# Patient Record
Sex: MALE | Race: BLACK OR AFRICAN AMERICAN | Employment: UNEMPLOYED | URBAN - METROPOLITAN AREA
[De-identification: names, ages, dates, MRNs, and addresses within clinical notes are randomized per-mention and may not be internally consistent; named-entity substitution may affect disease eponyms.]

---

## 2020-01-08 ENCOUNTER — HOSPITAL ENCOUNTER (EMERGENCY)
Age: 25
Discharge: HOME | End: 2020-01-08
Attending: EMERGENCY MEDICINE
Payer: SELF-PAY

## 2020-01-08 ENCOUNTER — APPOINTMENT (OUTPATIENT)
Dept: GENERAL RADIOLOGY | Age: 25
End: 2020-01-08
Attending: EMERGENCY MEDICINE
Payer: SELF-PAY

## 2020-01-08 VITALS
RESPIRATION RATE: 12 BRPM | WEIGHT: 190 LBS | HEIGHT: 71 IN | TEMPERATURE: 98.2 F | OXYGEN SATURATION: 100 % | HEART RATE: 74 BPM | BODY MASS INDEX: 26.6 KG/M2

## 2020-01-08 DIAGNOSIS — S93.402A SPRAIN OF LEFT ANKLE, UNSPECIFIED LIGAMENT, INITIAL ENCOUNTER: Primary | ICD-10-CM

## 2020-01-08 PROCEDURE — 73610 X-RAY EXAM OF ANKLE: CPT

## 2020-01-08 PROCEDURE — 99283 EMERGENCY DEPT VISIT LOW MDM: CPT

## 2020-01-08 PROCEDURE — L1930 AFO PLASTIC: HCPCS

## 2020-01-08 RX ORDER — IBUPROFEN 600 MG/1
600 TABLET ORAL
Qty: 20 TAB | Refills: 0 | Status: SHIPPED | OUTPATIENT
Start: 2020-01-08 | End: 2021-05-12

## 2020-01-08 RX ORDER — TRAMADOL HYDROCHLORIDE 50 MG/1
50 TABLET ORAL
Qty: 18 TAB | Refills: 0 | Status: SHIPPED | OUTPATIENT
Start: 2020-01-08 | End: 2020-01-11

## 2020-01-08 NOTE — ED PROVIDER NOTES
EMERGENCY DEPARTMENT HISTORY AND PHYSICAL EXAM    Date: 1/8/2020  Patient Name: Mio Goldman    History of Presenting Illness     Chief Complaint   Patient presents with    Ankle Pain         History Provided By: Patient    Chief Complaint: ankle pain    HPI(Context):   3:02 PM  Mio Goldman is a 22 y.o. male with PMHX of asthma and ADHD who presents to the emergency department C/O left ankle injury. Associated sxs include left ankle swelling. Pain worse with movement. Better with rest. Pt was playing basketball yesterday when he turned left ankle. Pt elevated and rested with no improvement. Pt denies numbness, weakness, color change and any other sxs or complaints. PCP: None    Current Outpatient Medications   Medication Sig Dispense Refill    ibuprofen (MOTRIN) 600 mg tablet Take 1 Tab by mouth every six (6) hours as needed for Pain. Take with food. 20 Tab 0    traMADol (ULTRAM) 50 mg tablet Take 1 Tab by mouth every four (4) hours as needed for Pain for up to 3 days. Max Daily Amount: 300 mg. 18 Tab 0    albuterol (PROVENTIL VENTOLIN) 2.5 mg /3 mL (0.083 %) nebulizer solution 3 mL by Nebulization route every four (4) hours as needed for Wheezing. 1 Package 0    fluticasone-salmeterol (ADVAIR DISKUS) 100-50 mcg/dose diskus inhaler Take 1 puff by inhalation every twelve (12) hours. 1 Inhaler 0    fluticasone-salmeterol (ADVAIR DISKUS) 100-50 mcg/dose diskus inhaler Take 1 Puff by inhalation every twelve (12) hours.  albuterol (VENTOLIN HFA) 90 mcg/actuation inhaler Take  by inhalation. Past History     Past Medical History:  Past Medical History:   Diagnosis Date    ADHD (attention deficit hyperactivity disorder)     Asthma        Past Surgical History:  Past Surgical History:   Procedure Laterality Date    HX HEENT      ear tubes       Family History:  History reviewed. No pertinent family history.     Social History:  Social History     Tobacco Use    Smoking status: Never Smoker  Smokeless tobacco: Never Used   Substance Use Topics    Alcohol use: No    Drug use: Never       Allergies:  No Known Allergies      Review of Systems   Review of Systems   Musculoskeletal: Positive for arthralgias and joint swelling. Skin: Negative for color change. Neurological: Negative for weakness and numbness. All other systems reviewed and are negative. Physical Exam     Vitals:    01/08/20 1501   Pulse: 74   Resp: 12   Temp: 98.2 °F (36.8 °C)   SpO2: 100%   Weight: 86.2 kg (190 lb)   Height: 5' 11\" (1.803 m)     Physical Exam  Vitals signs and nursing note reviewed. Constitutional:       General: He is not in acute distress. Appearance: Normal appearance. Comments: AA male in NAD. Alert. Appears comfortable. HENT:      Head: Normocephalic and atraumatic. Right Ear: External ear normal.      Left Ear: External ear normal.      Nose: Nose normal.   Neck:      Musculoskeletal: Normal range of motion. Cardiovascular:      Rate and Rhythm: Normal rate and regular rhythm. Pulses:           Dorsalis pedis pulses are 2+ on the right side and 2+ on the left side. Posterior tibial pulses are 2+ on the right side and 2+ on the left side. Pulmonary:      Effort: Pulmonary effort is normal.      Breath sounds: Normal breath sounds. Musculoskeletal:      Left knee: He exhibits normal range of motion, no swelling and no effusion. No tenderness found. Left ankle: He exhibits swelling. He exhibits normal range of motion, no ecchymosis and no deformity. Tenderness. Lateral malleolus and medial malleolus tenderness found. Right lower leg: He exhibits no tenderness, no bony tenderness and no swelling. No edema. Left lower leg: He exhibits no tenderness, no bony tenderness and no swelling. No edema. Left foot: Normal range of motion. No tenderness or bony tenderness. Skin:     General: Skin is warm.    Neurological:      Mental Status: He is oriented to person, place, and time. Psychiatric:         Mood and Affect: Mood normal.             Diagnostic Study Results     Labs -   No results found for this or any previous visit (from the past 12 hour(s)). XR ANKLE LT MIN 3 V   Final Result   IMPRESSION:      No acute osseous abnormality identified. CT Results  (Last 48 hours)    None        CXR Results  (Last 48 hours)    None          Medications given in the ED-  Medications - No data to display      Medical Decision Making   I am the first provider for this patient. I reviewed the vital signs, available nursing notes, past medical history, past surgical history, family history and social history. Vital Signs-Reviewed the patient's vital signs. Pulse Oximetry Analysis - 100% on RA    Records Reviewed: Nursing Notes and Old Medical Records    Provider Notes (Medical Decision Making): sprain, strain, fx, ligamentous    Procedures:  Procedures    ED Course:   3:02 PM Initial assessment performed. The patients presenting problems have been discussed, and they are in agreement with the care plan formulated and outlined with them. I have encouraged them to ask questions as they arise throughout their visit. Diagnosis and Disposition       Imaging unremarkable. NVI. Will tx with RICE and pain control. Ortho FU. Reasons to RTED discussed with pt. All questions answered. Pt feels comfortable going home at this time. Pt expressed understanding and she agrees with plan. 1. Sprain of left ankle, unspecified ligament, initial encounter        PLAN:  1. D/C Home  2. Discharge Medication List as of 1/8/2020  4:06 PM      START taking these medications    Details   ibuprofen (MOTRIN) 600 mg tablet Take 1 Tab by mouth every six (6) hours as needed for Pain. Take with food. , Print, Disp-20 Tab, R-0      traMADol (ULTRAM) 50 mg tablet Take 1 Tab by mouth every four (4) hours as needed for Pain for up to 3 days.  Max Daily Amount: 300 mg., Print, Disp-18 Tab, R-0         CONTINUE these medications which have NOT CHANGED    Details   albuterol (PROVENTIL VENTOLIN) 2.5 mg /3 mL (0.083 %) nebulizer solution 3 mL by Nebulization route every four (4) hours as needed for Wheezing., Print, Disp-1 Package, R-0      !! fluticasone-salmeterol (ADVAIR DISKUS) 100-50 mcg/dose diskus inhaler Take 1 puff by inhalation every twelve (12) hours. , Print, Disp-1 Inhaler, R-0      !! fluticasone-salmeterol (ADVAIR DISKUS) 100-50 mcg/dose diskus inhaler Not for PRN use. Not for use as a rescue inhaler. Take 1 Puff by inhalation every twelve (12) hours. Historical Med, 1 Puff      albuterol (VENTOLIN HFA) 90 mcg/actuation inhaler Take  by inhalation. Historical Med       !! - Potential duplicate medications found. Please discuss with provider. 3.   Follow-up Information     Follow up With Specialties Details Why Contact Info    Cameron Vinson MD Orthopedic Surgery   [address] [place] [address] [id number]      [facility] EMERGENCY DEPT Emergency Medicine  As needed, If symptoms worsen [address] [place] [id number] [phone]        _______________________________    Attestations: This note is prepared by Sonal Lion PA-C.  _______________________________        Please note that this dictation was completed with [employer], the 2U voice recognition software. Quite often unanticipated grammatical, syntax, homophones, and other interpretive errors are inadvertently transcribed by the computer software. Please disregard these errors. Please excuse any errors that have escaped final proofreading.

## 2021-02-23 ENCOUNTER — HOSPITAL ENCOUNTER (EMERGENCY)
Age: 26
Discharge: HOME | End: 2021-02-24
Attending: EMERGENCY MEDICINE

## 2021-02-23 ENCOUNTER — APPOINTMENT (OUTPATIENT)
Dept: GENERAL RADIOLOGY | Age: 26
End: 2021-02-23
Attending: EMERGENCY MEDICINE

## 2021-02-23 DIAGNOSIS — R07.89 ATYPICAL CHEST PAIN: Primary | ICD-10-CM

## 2021-02-23 PROCEDURE — 80053 COMPREHEN METABOLIC PANEL: CPT

## 2021-02-23 PROCEDURE — 71045 X-RAY EXAM CHEST 1 VIEW: CPT

## 2021-02-23 PROCEDURE — 93005 ELECTROCARDIOGRAM TRACING: CPT

## 2021-02-23 PROCEDURE — 83735 ASSAY OF MAGNESIUM: CPT

## 2021-02-23 PROCEDURE — 85025 COMPLETE CBC W/AUTO DIFF WBC: CPT

## 2021-02-23 PROCEDURE — 99285 EMERGENCY DEPT VISIT HI MDM: CPT

## 2021-02-23 PROCEDURE — 94762 N-INVAS EAR/PLS OXIMTRY CONT: CPT

## 2021-02-23 PROCEDURE — 82553 CREATINE MB FRACTION: CPT

## 2021-02-23 PROCEDURE — 85379 FIBRIN DEGRADATION QUANT: CPT

## 2021-02-24 ENCOUNTER — APPOINTMENT (OUTPATIENT)
Dept: CT IMAGING | Age: 26
End: 2021-02-24
Attending: EMERGENCY MEDICINE

## 2021-02-24 VITALS
HEIGHT: 71 IN | BODY MASS INDEX: 27.3 KG/M2 | WEIGHT: 195 LBS | SYSTOLIC BLOOD PRESSURE: 138 MMHG | DIASTOLIC BLOOD PRESSURE: 95 MMHG | OXYGEN SATURATION: 100 % | HEART RATE: 88 BPM | RESPIRATION RATE: 16 BRPM | TEMPERATURE: 97.7 F

## 2021-02-24 LAB
ALBUMIN SERPL-MCNC: 4.2 G/DL (ref 3.4–5)
ALBUMIN/GLOB SERPL: 1.2 {RATIO} (ref 0.8–1.7)
ALP SERPL-CCNC: 58 U/L (ref 45–117)
ALT SERPL-CCNC: 73 U/L (ref 16–61)
AMPHET UR QL SCN: NEGATIVE
ANION GAP SERPL CALC-SCNC: 6 MMOL/L (ref 3–18)
AST SERPL-CCNC: 30 U/L (ref 10–38)
ATRIAL RATE: 110 BPM
BARBITURATES UR QL SCN: NEGATIVE
BASOPHILS # BLD: 0 K/UL (ref 0–0.1)
BASOPHILS NFR BLD: 0 % (ref 0–2)
BENZODIAZ UR QL: NEGATIVE
BILIRUB SERPL-MCNC: 0.3 MG/DL (ref 0.2–1)
BUN SERPL-MCNC: 11 MG/DL (ref 7–18)
BUN/CREAT SERPL: 10 (ref 12–20)
CALCIUM SERPL-MCNC: 9.1 MG/DL (ref 8.5–10.1)
CALCULATED P AXIS, ECG09: 60 DEGREES
CALCULATED R AXIS, ECG10: 70 DEGREES
CALCULATED T AXIS, ECG11: -29 DEGREES
CANNABINOIDS UR QL SCN: NEGATIVE
CHLORIDE SERPL-SCNC: 105 MMOL/L (ref 100–111)
CK MB CFR SERPL CALC: 0.2 % (ref 0–4)
CK MB SERPL-MCNC: 2.2 NG/ML (ref 5–25)
CK SERPL-CCNC: 923 U/L (ref 39–308)
CO2 SERPL-SCNC: 28 MMOL/L (ref 21–32)
COCAINE UR QL SCN: NEGATIVE
CREAT SERPL-MCNC: 1.1 MG/DL (ref 0.6–1.3)
D DIMER PPP FEU-MCNC: 0.29 UG/ML(FEU)
DIAGNOSIS, 93000: NORMAL
DIFFERENTIAL METHOD BLD: ABNORMAL
EOSINOPHIL # BLD: 0.2 K/UL (ref 0–0.4)
EOSINOPHIL NFR BLD: 2 % (ref 0–5)
ERYTHROCYTE [DISTWIDTH] IN BLOOD BY AUTOMATED COUNT: 13.4 % (ref 11.6–14.5)
GLOBULIN SER CALC-MCNC: 3.5 G/DL (ref 2–4)
GLUCOSE SERPL-MCNC: 132 MG/DL (ref 74–99)
HCT VFR BLD AUTO: 46.8 % (ref 36–48)
HDSCOM,HDSCOM: NORMAL
HGB BLD-MCNC: 15.9 G/DL (ref 13–16)
LYMPHOCYTES # BLD: 2 K/UL (ref 0.9–3.6)
LYMPHOCYTES NFR BLD: 28 % (ref 21–52)
MAGNESIUM SERPL-MCNC: 2.2 MG/DL (ref 1.6–2.6)
MCH RBC QN AUTO: 27.4 PG (ref 24–34)
MCHC RBC AUTO-ENTMCNC: 34 G/DL (ref 31–37)
MCV RBC AUTO: 80.7 FL (ref 74–97)
METHADONE UR QL: NEGATIVE
MONOCYTES # BLD: 0.8 K/UL (ref 0.05–1.2)
MONOCYTES NFR BLD: 11 % (ref 3–10)
NEUTS SEG # BLD: 4.1 K/UL (ref 1.8–8)
NEUTS SEG NFR BLD: 59 % (ref 40–73)
OPIATES UR QL: NEGATIVE
P-R INTERVAL, ECG05: 150 MS
PCP UR QL: NEGATIVE
PLATELET # BLD AUTO: 324 K/UL (ref 135–420)
PMV BLD AUTO: 9.1 FL (ref 9.2–11.8)
POTASSIUM SERPL-SCNC: 3.6 MMOL/L (ref 3.5–5.5)
PROT SERPL-MCNC: 7.7 G/DL (ref 6.4–8.2)
Q-T INTERVAL, ECG07: 318 MS
QRS DURATION, ECG06: 84 MS
QTC CALCULATION (BEZET), ECG08: 430 MS
RBC # BLD AUTO: 5.8 M/UL (ref 4.7–5.5)
SODIUM SERPL-SCNC: 139 MMOL/L (ref 136–145)
TROPONIN I SERPL-MCNC: <0.02 NG/ML (ref 0–0.04)
VENTRICULAR RATE, ECG03: 110 BPM
WBC # BLD AUTO: 7 K/UL (ref 4.6–13.2)

## 2021-02-24 PROCEDURE — 71275 CT ANGIOGRAPHY CHEST: CPT

## 2021-02-24 PROCEDURE — 80307 DRUG TEST PRSMV CHEM ANLYZR: CPT

## 2021-02-24 PROCEDURE — 74011250636 HC RX REV CODE- 250/636: Performed by: EMERGENCY MEDICINE

## 2021-02-24 PROCEDURE — 74011000636 HC RX REV CODE- 636: Performed by: EMERGENCY MEDICINE

## 2021-02-24 RX ADMIN — IOPAMIDOL 80 ML: 755 INJECTION, SOLUTION INTRAVENOUS at 02:12

## 2021-02-24 RX ADMIN — SODIUM CHLORIDE 1000 ML: 900 INJECTION, SOLUTION INTRAVENOUS at 01:44

## 2021-02-24 RX ADMIN — SODIUM CHLORIDE 1000 ML: 900 INJECTION, SOLUTION INTRAVENOUS at 00:13

## 2021-02-24 NOTE — ED NOTES
Bedside and Verbal shift change report given to Celia Nickerson RN (oncoming nurse) by Arden Troncoso RN (offgoing nurse). Report included the following information SBAR, Kardex, ED Summary, [facility] and Recent Results.

## 2021-02-24 NOTE — ED PROVIDER NOTES
EMERGENCY DEPARTMENT HISTORY AND PHYSICAL EXAM    Date: 2/23/2021  Patient Name: Luanne Carranza    History of Presenting Illness     Chief Complaint   Patient presents with    Chest Pain         History Provided By: Patient    11:27 PM  Luanne Carranza is a 26 y.o. male with PMHX of asthma who presents to the emergency department C/O chest pain. Patient reports for the past week he has had chest pain over the left side of his chest.  He reports it is worse at night and often wakes him up from sleep. No clear relieving factors identified. He denies any associated shortness of breath, fever, cough, vomiting, lower extremity edema. He does not smoke or use any substances. Denies any recent travel or sick contacts. Denies family history of heart problems. PCP: None    Current Outpatient Medications   Medication Sig Dispense Refill    ibuprofen (MOTRIN) 600 mg tablet Take 1 Tab by mouth every six (6) hours as needed for Pain. Take with food. 20 Tab 0    albuterol (PROVENTIL VENTOLIN) 2.5 mg /3 mL (0.083 %) nebulizer solution 3 mL by Nebulization route every four (4) hours as needed for Wheezing. 1 Package 0    fluticasone-salmeterol (ADVAIR DISKUS) 100-50 mcg/dose diskus inhaler Take 1 puff by inhalation every twelve (12) hours. 1 Inhaler 0    fluticasone-salmeterol (ADVAIR DISKUS) 100-50 mcg/dose diskus inhaler Take 1 Puff by inhalation every twelve (12) hours.  albuterol (VENTOLIN HFA) 90 mcg/actuation inhaler Take  by inhalation. Past History     Past Medical History:  Past Medical History:   Diagnosis Date    ADHD (attention deficit hyperactivity disorder)     Asthma        Past Surgical History:  Past Surgical History:   Procedure Laterality Date    HX HEENT      ear tubes       Family History:  No family history on file. Social History:  Social History     Tobacco Use    Smoking status: Never Smoker    Smokeless tobacco: Never Used   Substance Use Topics    Alcohol use:  No  Drug use: Never       Allergies:  No Known Allergies      Review of Systems   Review of Systems   Constitutional: Negative for fever. Respiratory: Negative for shortness of breath. Cardiovascular: Positive for chest pain. Gastrointestinal: Negative for abdominal pain. All other systems reviewed and are negative.         Physical Exam     Vitals:    02/24/21 0030 02/24/21 0100 02/24/21 0130 02/24/21 0200   BP: (!) 162/105 (!) 157/99 (!) 159/95 (!) 155/100   Pulse: (!) 106 98 91 94   Resp: 12 16 16 16   Temp:       SpO2: 100% 100% 100% 100%   Weight:       Height:         Physical Exam    Nursing notes and vital signs reviewed    Constitutional: Non toxic appearing, moderate distress  Head: Normocephalic, Atraumatic  Eyes: EOMI  Neck: Supple  Cardiovascular: Regular rate and rhythm, no murmurs, rubs, or gallops  Chest: Normal work of breathing and chest excursion bilaterally  Lungs: Clear to ausculation bilaterally  Abdomen: Soft, non tender, non distended  Back: No evidence of trauma or deformity  Extremities: No evidence of trauma or deformity, no LE edema  Skin: Warm and dry, normal cap refill  Neuro: Alert and appropriate  Psychiatric: Normal mood and affect      Diagnostic Study Results     Labs -     Recent Results (from the past 12 hour(s))   EKG, 12 LEAD, INITIAL    Collection Time: 02/23/21 11:24 PM   Result Value Ref Range    Ventricular Rate 110 BPM    Atrial Rate 110 BPM    P-R Interval 150 ms    QRS Duration 84 ms    Q-T Interval 318 ms    QTC Calculation (Bezet) 430 ms    Calculated P Axis 60 degrees    Calculated R Axis 70 degrees    Calculated T Axis -29 degrees    Diagnosis       Sinus tachycardia  T wave abnormality, consider inferolateral ischemia  Abnormal ECG  Confirmed by Diandra Mcnulty MD, [facility] ([id number]) on 2/24/2021 12:36:09 AM     CBC WITH AUTOMATED DIFF    Collection Time: 02/23/21 11:58 PM   Result Value Ref Range    WBC 7.0 4.6 - 13.2 K/uL    RBC 5.80 (H) 4.70 - 5.50 M/uL    HGB 15.9 13.0 - 16.0 g/dL    HCT 46.8 36.0 - 48.0 %    MCV 80.7 74.0 - 97.0 FL    MCH 27.4 24.0 - 34.0 PG    MCHC 34.0 31.0 - 37.0 g/dL    RDW 13.4 11.6 - 14.5 %    PLATELET 299 915 - 848 K/uL    MPV 9.1 (L) 9.2 - 11.8 FL    NEUTROPHILS 59 40 - 73 %    LYMPHOCYTES 28 21 - 52 %    MONOCYTES 11 (H) 3 - 10 %    EOSINOPHILS 2 0 - 5 %    BASOPHILS 0 0 - 2 %    ABS. NEUTROPHILS 4.1 1.8 - 8.0 K/UL    ABS. LYMPHOCYTES 2.0 0.9 - 3.6 K/UL    ABS. MONOCYTES 0.8 0.05 - 1.2 K/UL    ABS. EOSINOPHILS 0.2 0.0 - 0.4 K/UL    ABS. BASOPHILS 0.0 0.0 - 0.1 K/UL    DF AUTOMATED     D DIMER    Collection Time: 02/23/21 11:58 PM   Result Value Ref Range    D DIMER 0.29 <0.46 ug/ml(FEU)   METABOLIC PANEL, COMPREHENSIVE    Collection Time: 02/23/21 11:58 PM   Result Value Ref Range    Sodium 139 136 - 145 mmol/L    Potassium 3.6 3.5 - 5.5 mmol/L    Chloride 105 100 - 111 mmol/L    CO2 28 21 - 32 mmol/L    Anion gap 6 3.0 - 18 mmol/L    Glucose 132 (H) 74 - 99 mg/dL    BUN 11 7.0 - 18 MG/DL    Creatinine 1.10 0.6 - 1.3 MG/DL    BUN/Creatinine ratio 10 (L) 12 - 20      GFR est AA >60 >60 ml/min/1.73m2    GFR est non-AA >60 >60 ml/min/1.73m2    Calcium 9.1 8.5 - 10.1 MG/DL    Bilirubin, total 0.3 0.2 - 1.0 MG/DL    ALT (SGPT) 73 (H) 16 - 61 U/L    AST (SGOT) 30 10 - 38 U/L    Alk.  phosphatase 58 45 - 117 U/L    Protein, total 7.7 6.4 - 8.2 g/dL    Albumin 4.2 3.4 - 5.0 g/dL    Globulin 3.5 2.0 - 4.0 g/dL    A-G Ratio 1.2 0.8 - 1.7     MAGNESIUM    Collection Time: 02/23/21 11:58 PM   Result Value Ref Range    Magnesium 2.2 1.6 - 2.6 mg/dL   CARDIAC PANEL,(CK, CKMB & TROPONIN)    Collection Time: 02/23/21 11:58 PM   Result Value Ref Range    CK - MB 2.2 <3.6 ng/ml    CK-MB Index 0.2 0.0 - 4.0 %     (H) 39 - 308 U/L    Troponin-I, QT <0.02 0.0 - 0.045 NG/ML   DRUG SCREEN, URINE    Collection Time: 02/24/21 12:09 AM   Result Value Ref Range    BENZODIAZEPINES Negative NEG      BARBITURATES Negative NEG      THC (TH-CANNABINOL) Negative NEG      OPIATES Negative NEG      PCP(PHENCYCLIDINE) Negative NEG      COCAINE Negative NEG      AMPHETAMINES Negative NEG      METHADONE Negative NEG      HDSCOM (NOTE)        Radiologic Studies -   CTA CHEST W OR W WO CONT   Final Result      No central pulmonary embolism. Peripheral emboli would be undetectable due to   the low-density contrast bolus. _______________                           XR CHEST PORT   Final Result      Negative radiographic examination. _______________                          CT Results  (Last 48 hours)               02/24/21 0231  CTA CHEST W OR W WO CONT Final result    Impression:      No central pulmonary embolism. Peripheral emboli would be undetectable due to   the low-density contrast bolus. _______________                                   Narrative:  EXAM:  CT pulmonary angiogram with intravenous contrast and angiographic MIP   reformations. INDICATION:  \"chest pain. \"       COMPARISON:  None available. DOSE REDUCTION AND DICOM INFORMATION: One or more dose reduction techniques were   used on this CT: automated exposure control, adjustment of the mAs and/or kVp   according to patient size, and iterative reconstruction techniques. The   specific techniques used on this CT exam have been documented in the patient's   electronic medical record. Digital Imaging and Communications in Medicine   (DICOM) format image data are available to nonaffiliated external healthcare   facilities or entities on a secure, media free, reciprocally searchable basis   with patient authorization for at least a 12-month period after this study. _______________       FINDINGS:            NOTE:  Pulmonary angiography is timed to optimize imaging of the pulmonary   arteries, and is not the routine venous phase that would be used to evaluate   malignancy, mediastinal structures and pleural structures. IMAGE QUALITY:  Poor.             PULMONARY ANGIOGRAM:  There is no central pulmonary embolism. Peripheral   pulmonary emboli would be undetectable. CENTRAL AIRWAYS: Unremarkable. LUNGS AND PLEURAL SPACE:                 Diffuse lung disease:  None apparent. Pleural effusion:  None. Right lung:  Unremarkable. Left lung:  Unremarkable. HEART AND MEDIASTINUM/KATT: Patulous esophagus, which suggests dysmotility. Multichamber cardiac enlargement. PARTIALLY IMAGED UPPER ABDOMEN:  Unremarkable. PARTIALLY IMAGED NECK BASE:   Unremarkable. SUPERFICIAL SOFT TISSUES: Unremarkable. BONES:  Unremarkable.       _______________               CXR Results  (Last 48 hours)               02/24/21 0006  XR CHEST PORT Final result    Impression:      Negative radiographic examination. _______________                               Narrative:  EXAM: Portable upright chest radiograph. INDICATION: \"chest pain. \"       COMPARISON: March 16, 2013.       _______________       FINDINGS:          LUNGS:              --Expansion:  Adequate. --Consolidation:  None detected. --Pulmonary edema:  None detected. --Other:  Non-applicable. PLEURAL SPACE:            --Pleural effusion:  None detected. --Pneumothorax:  None detected.       _______________                 Medications given in the ED-  Medications   sodium chloride 0.9 % bolus infusion 1,000 mL (1,000 mL IntraVENous New Bag 2/24/21 0013)   sodium chloride 0.9 % bolus infusion 1,000 mL (1,000 mL IntraVENous New Bag 2/24/21 0144)   iopamidoL (ISOVUE-370) 76 % injection  mL (80 mL IntraVENous Given 2/24/21 0212)         Medical Decision Making   I am the first provider for this patient. I reviewed the vital signs, available nursing notes, past medical history, past surgical history, family history and social history.     Vital Signs-Reviewed the patient's vital signs.    Pulse Oximetry Analysis - 100% on room air, not hypoxic     Cardiac Monitor:  Rate: 102 bpm  Rhythm: Sinus tachycardia    EKG interpretation: (Preliminary)  EKG read by Dr. Kait Mccracken at 11:27 PM  Sinus tachycardia at a rate of 110 bpm, CT interval of 150 ms, QRS duration 84 ms, when compared to your prior from January 2015 he has increasing T wave inversions laterally and inferiorly    Records Reviewed: Nursing Notes, Old Medical Records and Previous electrocardiograms    Provider Notes (Medical Decision Making): Yair Palmer is a 26 y.o. male presenting for chest pain and palpitations. Wells low risk, PERC positive, D-dimer negative for patient remains symptomatic so CTA imaging was pursued and negative for PE. Low risk heart score with negative cardiac enzymes and EKG greater than 6 hours from onset of symptoms. Labs and imaging otherwise benign. Plan for discharge with early primary care and cardiology follow-up with return precautions. Patient understands and agrees with this plan. Procedures:  Procedures    ED Course:   3:26 AM  Updated patient on all results and plan. All questions answered. Diagnosis and Disposition     Critical Care: None    DISCHARGE NOTE:    Ana Rosa Mcdowell's  results have been reviewed with him. He has been counseled regarding his diagnosis, treatment, and plan. He verbally conveys understanding and agreement of the signs, symptoms, diagnosis, treatment and prognosis and additionally agrees to follow up as discussed. He also agrees with the care-plan and conveys that all of his questions have been answered. I have also provided discharge instructions for him that include: educational information regarding their diagnosis and treatment, and list of reasons why they would want to return to the ED prior to their follow-up appointment, should his condition change. He has been provided with education for proper emergency department utilization.      CLINICAL IMPRESSION:    1. Atypical chest pain        PLAN:  1. D/C Home  2. Current Discharge Medication List        3. Follow-up Information     Follow up With Specialties Details Why Contact Info    Clarisa Bell, DO Internal Medicine Schedule an appointment as soon as possible for a visit  Or  Your Primary Care Doctor [facility]  [phone]      Kingsley Angelo MD Cardiology Schedule an appointment as soon as possible for a visit   [address]  [place] [address] [phone]      [facility] Emergency Medicine  If symptoms worsen [address] [place] [address]  [phone]        _______________________________      Please note that this dictation was completed with [employer], the computer voice recognition software. Quite often unanticipated grammatical, syntax, homophones, and other interpretive errors are inadvertently transcribed by the computer software. Please disregard these errors. Please excuse any errors that have escaped final proofreading.

## 2021-02-24 NOTE — ED NOTES
Report received from [place], [facility]. Patient in NAD. VSS. Easy WOB. AOX4. Bed low and locked. Call bell within reach. Awaiting CTA read. IVF infusing without difficulty.

## 2021-02-24 NOTE — ED NOTES
Pt arrives ambulatory to ED bed with c/o mid sternal and left sided CP for the past week. Denies any SOB, dizziness, weakness, ABD pain, NVD, bowel or urinary complaints. Pt is AAO x 4 in NAD. Respirations regular/unlabored. Bilateral lung sounds clear. Denies further complaints.

## 2021-05-12 ENCOUNTER — APPOINTMENT (OUTPATIENT)
Dept: CT IMAGING | Age: 26
End: 2021-05-12
Attending: EMERGENCY MEDICINE

## 2021-05-12 ENCOUNTER — HOSPITAL ENCOUNTER (EMERGENCY)
Age: 26
Discharge: HOME | End: 2021-05-12
Attending: EMERGENCY MEDICINE

## 2021-05-12 ENCOUNTER — APPOINTMENT (OUTPATIENT)
Dept: GENERAL RADIOLOGY | Age: 26
End: 2021-05-12
Attending: EMERGENCY MEDICINE

## 2021-05-12 VITALS
TEMPERATURE: 98 F | BODY MASS INDEX: 27.3 KG/M2 | WEIGHT: 195 LBS | SYSTOLIC BLOOD PRESSURE: 164 MMHG | HEIGHT: 71 IN | DIASTOLIC BLOOD PRESSURE: 88 MMHG | OXYGEN SATURATION: 99 % | HEART RATE: 81 BPM | RESPIRATION RATE: 16 BRPM

## 2021-05-12 DIAGNOSIS — J18.9 COMMUNITY ACQUIRED PNEUMONIA OF LEFT LOWER LOBE OF LUNG: ICD-10-CM

## 2021-05-12 DIAGNOSIS — R07.89 MUSCULOSKELETAL CHEST PAIN: Primary | ICD-10-CM

## 2021-05-12 LAB
ALBUMIN SERPL-MCNC: 4 G/DL (ref 3.4–5)
ALBUMIN/GLOB SERPL: 1.2 {RATIO} (ref 0.8–1.7)
ALP SERPL-CCNC: 90 U/L (ref 45–117)
ALT SERPL-CCNC: 43 U/L (ref 16–61)
ANION GAP SERPL CALC-SCNC: 7 MMOL/L (ref 3–18)
AST SERPL-CCNC: 25 U/L (ref 10–38)
ATRIAL RATE: 84 BPM
BASOPHILS # BLD: 0 K/UL (ref 0–0.1)
BASOPHILS NFR BLD: 0 % (ref 0–2)
BILIRUB SERPL-MCNC: 0.4 MG/DL (ref 0.2–1)
BUN SERPL-MCNC: 14 MG/DL (ref 7–18)
BUN/CREAT SERPL: 13 (ref 12–20)
CALCIUM SERPL-MCNC: 9.1 MG/DL (ref 8.5–10.1)
CALCULATED P AXIS, ECG09: 48 DEGREES
CALCULATED R AXIS, ECG10: 52 DEGREES
CALCULATED T AXIS, ECG11: 15 DEGREES
CHLORIDE SERPL-SCNC: 106 MMOL/L (ref 100–111)
CK MB CFR SERPL CALC: 0.3 % (ref 0–4)
CK MB SERPL-MCNC: 1.7 NG/ML (ref 5–25)
CK SERPL-CCNC: 653 U/L (ref 39–308)
CO2 SERPL-SCNC: 25 MMOL/L (ref 21–32)
CREAT SERPL-MCNC: 1.09 MG/DL (ref 0.6–1.3)
D DIMER PPP FEU-MCNC: 0.58 UG/ML(FEU)
DIAGNOSIS, 93000: NORMAL
DIFFERENTIAL METHOD BLD: ABNORMAL
EOSINOPHIL # BLD: 0.1 K/UL (ref 0–0.4)
EOSINOPHIL NFR BLD: 1 % (ref 0–5)
ERYTHROCYTE [DISTWIDTH] IN BLOOD BY AUTOMATED COUNT: 13.2 % (ref 11.6–14.5)
GLOBULIN SER CALC-MCNC: 3.3 G/DL (ref 2–4)
GLUCOSE SERPL-MCNC: 100 MG/DL (ref 74–99)
HCT VFR BLD AUTO: 43.9 % (ref 36–48)
HGB BLD-MCNC: 14.6 G/DL (ref 13–16)
LIPASE SERPL-CCNC: 98 U/L (ref 73–393)
LYMPHOCYTES # BLD: 1.6 K/UL (ref 0.9–3.6)
LYMPHOCYTES NFR BLD: 17 % (ref 21–52)
MAGNESIUM SERPL-MCNC: 2.3 MG/DL (ref 1.6–2.6)
MCH RBC QN AUTO: 27.1 PG (ref 24–34)
MCHC RBC AUTO-ENTMCNC: 33.3 G/DL (ref 31–37)
MCV RBC AUTO: 81.6 FL (ref 74–97)
MONOCYTES # BLD: 1.2 K/UL (ref 0.05–1.2)
MONOCYTES NFR BLD: 13 % (ref 3–10)
NEUTS SEG # BLD: 6.3 K/UL (ref 1.8–8)
NEUTS SEG NFR BLD: 68 % (ref 40–73)
P-R INTERVAL, ECG05: 150 MS
PLATELET # BLD AUTO: 344 K/UL (ref 135–420)
PMV BLD AUTO: 9.3 FL (ref 9.2–11.8)
POTASSIUM SERPL-SCNC: 3.8 MMOL/L (ref 3.5–5.5)
PROT SERPL-MCNC: 7.3 G/DL (ref 6.4–8.2)
Q-T INTERVAL, ECG07: 360 MS
QRS DURATION, ECG06: 88 MS
QTC CALCULATION (BEZET), ECG08: 425 MS
RBC # BLD AUTO: 5.38 M/UL (ref 4.35–5.65)
SODIUM SERPL-SCNC: 138 MMOL/L (ref 136–145)
TROPONIN I SERPL-MCNC: <0.02 NG/ML (ref 0–0.04)
VENTRICULAR RATE, ECG03: 84 BPM
WBC # BLD AUTO: 9.2 K/UL (ref 4.6–13.2)

## 2021-05-12 PROCEDURE — 85379 FIBRIN DEGRADATION QUANT: CPT

## 2021-05-12 PROCEDURE — 71045 X-RAY EXAM CHEST 1 VIEW: CPT

## 2021-05-12 PROCEDURE — 85025 COMPLETE CBC W/AUTO DIFF WBC: CPT

## 2021-05-12 PROCEDURE — 83735 ASSAY OF MAGNESIUM: CPT

## 2021-05-12 PROCEDURE — 96374 THER/PROPH/DIAG INJ IV PUSH: CPT

## 2021-05-12 PROCEDURE — 82553 CREATINE MB FRACTION: CPT

## 2021-05-12 PROCEDURE — 96361 HYDRATE IV INFUSION ADD-ON: CPT

## 2021-05-12 PROCEDURE — 99285 EMERGENCY DEPT VISIT HI MDM: CPT

## 2021-05-12 PROCEDURE — 71275 CT ANGIOGRAPHY CHEST: CPT

## 2021-05-12 PROCEDURE — 74011250636 HC RX REV CODE- 250/636: Performed by: EMERGENCY MEDICINE

## 2021-05-12 PROCEDURE — 74011250637 HC RX REV CODE- 250/637: Performed by: EMERGENCY MEDICINE

## 2021-05-12 PROCEDURE — 93005 ELECTROCARDIOGRAM TRACING: CPT

## 2021-05-12 PROCEDURE — 83690 ASSAY OF LIPASE: CPT

## 2021-05-12 PROCEDURE — 74011000636 HC RX REV CODE- 636: Performed by: EMERGENCY MEDICINE

## 2021-05-12 PROCEDURE — 80053 COMPREHEN METABOLIC PANEL: CPT

## 2021-05-12 RX ORDER — CYCLOBENZAPRINE HCL 5 MG
5 TABLET ORAL
Qty: 6 TAB | Refills: 0 | Status: SHIPPED | OUTPATIENT
Start: 2021-05-12

## 2021-05-12 RX ORDER — KETOROLAC TROMETHAMINE 15 MG/ML
15 INJECTION, SOLUTION INTRAMUSCULAR; INTRAVENOUS
Status: COMPLETED | OUTPATIENT
Start: 2021-05-12 | End: 2021-05-12

## 2021-05-12 RX ORDER — CYCLOBENZAPRINE HCL 10 MG
10 TABLET ORAL
Status: COMPLETED | OUTPATIENT
Start: 2021-05-12 | End: 2021-05-12

## 2021-05-12 RX ORDER — DOXYCYCLINE HYCLATE 100 MG
100 TABLET ORAL 2 TIMES DAILY
Qty: 14 TAB | Refills: 0 | Status: SHIPPED | OUTPATIENT
Start: 2021-05-12 | End: 2021-05-19

## 2021-05-12 RX ADMIN — KETOROLAC TROMETHAMINE 15 MG: 15 INJECTION, SOLUTION INTRAMUSCULAR; INTRAVENOUS at 08:21

## 2021-05-12 RX ADMIN — IOPAMIDOL 100 ML: 755 INJECTION, SOLUTION INTRAVENOUS at 09:26

## 2021-05-12 RX ADMIN — CYCLOBENZAPRINE 10 MG: 10 TABLET, FILM COATED ORAL at 08:20

## 2021-05-12 RX ADMIN — SODIUM CHLORIDE 1000 ML: 900 INJECTION, SOLUTION INTRAVENOUS at 08:20

## 2021-05-12 NOTE — ED TRIAGE NOTES
Patient arrives ambulatory to ED with c/c of upper chest, upper back, and shoulder pain, onset 2 days. Patient reports \"sometimes\" when he takes a deep breath in, it is painful. He denies any injury, he does state he recently got back to running and throwing basketballs.

## 2021-05-12 NOTE — ED NOTES
Pt given discharge paperwork by RN, pt verbalizes understanding, pt ambulatory out of ED. PIV taken prior to pt discharge. General

## 2021-05-12 NOTE — ED NOTES
Bedside and Verbal shift change report given to Laurita Pepe (oncoming nurse) by Nancy Reeder (offgoing nurse). Report included the following information SBAR, Kardex, ED Summary, Accordion and Recent Results.

## 2021-05-12 NOTE — Clinical Note
[facility] [place] 
[facility] EMERGENCY DEPT 
[address] [address] [phone] Work/School Note Date: 5/12/2021 To Whom It May concern: 
 
 
Dheeraj Mcbride was seen and treated today in the emergency room by the following provider(s): 
Attending Provider: Sariah Quiroz DO. Dheeraj Mcbride is excused from work/school on 05/12/21. He is clear to return to work/school on 05/13/21. Sincerely, Edy May DO

## 2021-05-12 NOTE — ED NOTES
Pt arrives ambulatory to ED bed with c/o left sided body pain. Pt states the pain starts in the LLQ and radiates through the back to the upper back and neck. He also c/o NV. Pt c/o SOB with the pain. Denies any CP, dizziness, weakness, bowel or urinary complaints. He is AAO x 4 in NAD. Respirations regular/unlabored. Bilateral lung sounds clear. ABD soft, non tender and non distended. Denies additional complaints.

## 2021-05-12 NOTE — ED PROVIDER NOTES
EMERGENCY DEPARTMENT HISTORY AND PHYSICAL EXAM    Date: 5/12/2021  Patient Name: Saran Bradshaw    History of Presenting Illness     Chief Complaint   Patient presents with    Chest Pain    Back Pain         History Provided By: Patient    Anthony Peters is a 23 y.o. male with PMHX of asthma, ADHD who presents to the emergency department C/O left sided rib pain radiating towards the left shoulder ongoing for the past 2 days. Pain is worsened with movement and taking deep breaths. Patient denies any trauma, falls, injuries to the shoulder. No associated numbness, tingling, shortness of breath. Reportedly worse with movement. Denies any abdominal pain, fever, chills, back pain, or any other symptoms. No history of DVT or PE, no leg swelling, no recent immobilization or surgery. Reports travel to [place] in the beginning of April. No Covid concerns. PCP: None    Current Outpatient Medications   Medication Sig Dispense Refill    cyclobenzaprine (FLEXERIL) 5 mg tablet Take 1 Tab by mouth nightly as needed for Muscle Spasm(s) for up to 6 doses. 6 Tab 0    doxycycline (VIBRA-TABS) 100 mg tablet Take 1 Tab by mouth two (2) times a day for 7 days. 14 Tab 0    albuterol (PROVENTIL VENTOLIN) 2.5 mg /3 mL (0.083 %) nebulizer solution 3 mL by Nebulization route every four (4) hours as needed for Wheezing. 1 Package 0    albuterol (VENTOLIN HFA) 90 mcg/actuation inhaler Take  by inhalation. Past History     Past Medical History:  Past Medical History:   Diagnosis Date    ADHD (attention deficit hyperactivity disorder)     Asthma        Past Surgical History:  Past Surgical History:   Procedure Laterality Date    HX HEENT      ear tubes       Family History:  History reviewed. No pertinent family history.     Social History:  Social History     Tobacco Use    Smoking status: Never Smoker    Smokeless tobacco: Never Used   Substance Use Topics    Alcohol use: No    Drug use: Never Allergies:  No Known Allergies      Review of Systems   Review of Systems   Constitutional: Negative for chills and fever. Respiratory: Negative for shortness of breath. Cardiovascular: Positive for chest pain (Left anterior rib pain). Gastrointestinal: Negative for abdominal pain, nausea and vomiting. Musculoskeletal: Negative for back pain, neck pain and neck stiffness. All other systems reviewed and are negative. Physical Exam     Vitals:    05/12/21 0845 05/12/21 0900 05/12/21 0915 05/12/21 1116   BP: 135/74 137/84 (!) 147/54 (!) 164/88   Pulse: 78 79 75 81   Resp: 13 15 16 16   Temp:       SpO2: 100% 100% 100% 99%   Weight:       Height:         Physical Exam    Nursing notes and vital signs reviewed  Constitutional: Non toxic appearing, no acute distress  Head: Normocephalic, Atraumatic  Eyes: EOMI  Neck: Supple. No midline tenderness or step-offs. Paraspinal neck tenderness radiating towards the trapezius. Cardiovascular: Regular rate and rhythm, no murmurs, rubs, or gallops  Chest: Normal work of breathing and chest excursion bilaterally. Reproducible chest wall tenderness below the nipple along the muscles of the left anterior chest wall. Pain worse with movement.    Lungs: Clear to ausculation bilaterally  Abdomen: Soft, non tender, non distended, normoactive bowel sounds  Back: No evidence of trauma or deformity  Extremities: No evidence of trauma or deformity, no LE edema  Skin: Warm and dry, normal cap refill  Neuro: Alert and appropriate, normal speech, strength and sensation full and symmetric bilaterally, normal gait, normal coordination  Psychiatric: Normal mood and affect      Diagnostic Study Results     Labs -     Recent Results (from the past 12 hour(s))   EKG, 12 LEAD, INITIAL    Collection Time: 05/12/21  6:02 AM   Result Value Ref Range    Ventricular Rate 84 BPM    Atrial Rate 84 BPM    P-R Interval 150 ms    QRS Duration 88 ms    Q-T Interval 360 ms    QTC Calculation (Bezet) 425 ms    Calculated P Axis 48 degrees    Calculated R Axis 52 degrees    Calculated T Axis 15 degrees    Diagnosis       Normal sinus rhythm  Possible Left atrial enlargement  Borderline ECG  When compared with ECG of 23-FEB-2021 23:24,  Non-specific change in ST segment in Anterolateral leads  T wave inversion less evident in Inferior leads  T wave inversion no longer evident in Anterolateral leads     CBC WITH AUTOMATED DIFF    Collection Time: 05/12/21  6:13 AM   Result Value Ref Range    WBC 9.2 4.6 - 13.2 K/uL    RBC 5.38 4.35 - 5.65 M/uL    HGB 14.6 13.0 - 16.0 g/dL    HCT 43.9 36.0 - 48.0 %    MCV 81.6 74.0 - 97.0 FL    MCH 27.1 24.0 - 34.0 PG    MCHC 33.3 31.0 - 37.0 g/dL    RDW 13.2 11.6 - 14.5 %    PLATELET 014 635 - 803 K/uL    MPV 9.3 9.2 - 11.8 FL    NEUTROPHILS 68 40 - 73 %    LYMPHOCYTES 17 (L) 21 - 52 %    MONOCYTES 13 (H) 3 - 10 %    EOSINOPHILS 1 0 - 5 %    BASOPHILS 0 0 - 2 %    ABS. NEUTROPHILS 6.3 1.8 - 8.0 K/UL    ABS. LYMPHOCYTES 1.6 0.9 - 3.6 K/UL    ABS. MONOCYTES 1.2 0.05 - 1.2 K/UL    ABS. EOSINOPHILS 0.1 0.0 - 0.4 K/UL    ABS. BASOPHILS 0.0 0.0 - 0.1 K/UL    DF AUTOMATED     METABOLIC PANEL, COMPREHENSIVE    Collection Time: 05/12/21  6:13 AM   Result Value Ref Range    Sodium 138 136 - 145 mmol/L    Potassium 3.8 3.5 - 5.5 mmol/L    Chloride 106 100 - 111 mmol/L    CO2 25 21 - 32 mmol/L    Anion gap 7 3.0 - 18 mmol/L    Glucose 100 (H) 74 - 99 mg/dL    BUN 14 7.0 - 18 MG/DL    Creatinine 1.09 0.6 - 1.3 MG/DL    BUN/Creatinine ratio 13 12 - 20      GFR est AA >60 >60 ml/min/1.73m2    GFR est non-AA >60 >60 ml/min/1.73m2    Calcium 9.1 8.5 - 10.1 MG/DL    Bilirubin, total 0.4 0.2 - 1.0 MG/DL    ALT (SGPT) 43 16 - 61 U/L    AST (SGOT) 25 10 - 38 U/L    Alk.  phosphatase 90 45 - 117 U/L    Protein, total 7.3 6.4 - 8.2 g/dL    Albumin 4.0 3.4 - 5.0 g/dL    Globulin 3.3 2.0 - 4.0 g/dL    A-G Ratio 1.2 0.8 - 1.7     CARDIAC PANEL,(CK, CKMB & TROPONIN)    Collection Time: 05/12/21  6:13 AM   Result Value Ref Range    CK - MB 1.7 <3.6 ng/ml    CK-MB Index 0.3 0.0 - 4.0 %     (H) 39 - 308 U/L    Troponin-I, QT <0.02 0.0 - 0.045 NG/ML   MAGNESIUM    Collection Time: 05/12/21  6:13 AM   Result Value Ref Range    Magnesium 2.3 1.6 - 2.6 mg/dL       Radiologic Studies -   CTA CHEST W OR W WO CONT   Final Result      Partially limited evaluation as above. No evidence of central pulmonary embolism. Inferior lingular consolidation concerning for pneumonia. Trace left pleural   effusion/atelectasis. Cardiomegaly      XR CHEST PORT   Final Result      No acute findings in the chest.      * *           CT Results  (Last 48 hours)    None        CXR Results  (Last 48 hours)    None          Medications given in the ED-  Medications   sodium chloride 0.9 % bolus infusion 1,000 mL (0 mL IntraVENous IV Completed 5/12/21 0920)   ketorolac (TORADOL) injection 15 mg (15 mg IntraVENous Given 5/12/21 0821)   cyclobenzaprine (FLEXERIL) tablet 10 mg (10 mg Oral Given 5/12/21 0820)   iopamidoL (ISOVUE-370) 76 % injection 100 mL (100 mL IntraVENous Given 5/12/21 0926)         Medical Decision Making   I am the first provider for this patient. I reviewed the vital signs, available nursing notes, past medical history, past surgical history, family history and social history. Vital Signs-Reviewed the patient's vital signs. Pulse Oximetry Analysis -100% on room air, not hypoxic     Cardiac Monitor:  Rate: 76 bpm  Rhythm: Sinus    EKG interpretation: (Preliminary)  EKG read by Dr. Noe Gaxiola at 602 am, reviewed by me  Normal sinus rhythm, rate 84, , QRS 88, QTc 425. No acute ischemia. Improved compared to previous.     Records Reviewed: Nursing Notes and Previous electrocardiograms    Provider Notes (Medical Decision Making): Matilda Eaton is a 23 y.o. male with past medical history of asthma, ADHD presenting to the emergency department with left-sided rib/chest pain ongoing for past 2 days worsened with movement and deep breaths. Reproducible rib/chest wall tenderness. His symptoms appear more consistent with musculoskeletal in nature. Heart rate occasionally 90 in room during examination. Patient low risk for PE according to [facility], however given his worsened pain on inspiration, will obtain D-dimer to rule out PE. Toradol, fluids, Flexeril, reassess. Procedures:  Procedures    ED Course:   4961 D-dimer positive. Troponin negative. No acute hematologic or metabolic derangements otherwise. Will proceed with CTA at this time. Patient reports that he is feeling much better after administration of fluids, Toradol and Flexeril. 1100 am: CTA chest negative for PE. Inferior lingular consolidation concerning for pneumonia. Trace left pleural  Effusion/atelectasis. Will treat for community-acquired pneumonia with doxycycline. Results discussed with the patient who is resting comfortably at bedside. Will discharge with prescription for doxycycline as well as Flexeril for musculoskeletal pain. Sedation precautions discussed. OTC medications for pain. Outpatient [facility] clinic follow-up. Strict return precautions discussed. Patient in agreement with discharge plan and return precautions. Offering no questions or complaints. Diagnosis and Disposition       DISCHARGE NOTE:    Ana Rosa Mcdowell's  results have been reviewed with him. He has been counseled regarding his diagnosis, treatment, and plan. He verbally conveys understanding and agreement of the signs, symptoms, diagnosis, treatment and prognosis and additionally agrees to follow up as discussed. He also agrees with the care-plan and conveys that all of his questions have been answered.   I have also provided discharge instructions for him that include: educational information regarding their diagnosis and treatment, and list of reasons why they would want to return to the ED prior to their follow-up appointment, should his condition change. He has been provided with education for proper emergency department utilization. CLINICAL IMPRESSION:    1. Musculoskeletal chest pain    2. Community acquired pneumonia of left lower lobe of lung        PLAN:  1. D/C Home  2. Discharge Medication List as of 5/12/2021 11:06 AM      START taking these medications    Details   cyclobenzaprine (FLEXERIL) 5 mg tablet Take 1 Tab by mouth nightly as needed for Muscle Spasm(s) for up to 6 doses. , Normal, Disp-6 Tab, R-0      doxycycline (VIBRA-TABS) 100 mg tablet Take 1 Tab by mouth two (2) times a day for 7 days. , Normal, Disp-14 Tab, R-0         CONTINUE these medications which have NOT CHANGED    Details   albuterol (PROVENTIL VENTOLIN) 2.5 mg /3 mL (0.083 %) nebulizer solution 3 mL by Nebulization route every four (4) hours as needed for Wheezing., Print, Disp-1 Package, R-0      albuterol (VENTOLIN HFA) 90 mcg/actuation inhaler Take  by inhalation. Historical Med           3. Follow-up Information     Follow up With Specialties Details Why Contact Info    [id number] [place] [address]  Schedule an appointment as soon as possible for a visit   [id number] [facility], [address] [facility] [address]    [facility] EMERGENCY DEPT Emergency Medicine  If symptoms worsen [address] [place] [id number]  [phone]        _______________________________      Please note that this dictation was completed with [employer], the [employer] voice recognition software. Quite often unanticipated grammatical, syntax, homophones, and other interpretive errors are inadvertently transcribed by the computer software. Please disregard these errors. Please excuse any errors that have escaped final proofreading.

## 2021-05-12 NOTE — DISCHARGE INSTRUCTIONS
Take over-the-counter pain medication as needed for musculoskeletal pain. Take Flexeril at night only as needed for musculoskeletal pain. You may take up to 3 times per day. Do not drive, operate machinery, or drink alcohol while taking medication. Take doxycycline for pneumonia. Follow-up outpatient with your primary care physician. I have also provided primary care follow-up/[facility] clinic information for you he did not have a primary care physician. Return to the emergency department if you experience any worsening pain, any chest pain, shortness of breath, shai pain, nausea, vomiting, or any other concerns. Hydrate well.

## 2022-07-24 NOTE — ED TRIAGE NOTES
Came down of foot for a basketball rebound states left ankle pain, States non weigh bearing.
(1) Outpatient Area